# Patient Record
Sex: FEMALE | Race: WHITE | HISPANIC OR LATINO | URBAN - METROPOLITAN AREA
[De-identification: names, ages, dates, MRNs, and addresses within clinical notes are randomized per-mention and may not be internally consistent; named-entity substitution may affect disease eponyms.]

---

## 2018-08-24 ENCOUNTER — INPATIENT (INPATIENT)
Facility: HOSPITAL | Age: 28
LOS: 0 days | Discharge: HOME | DRG: 743 | End: 2018-08-24
Attending: OBSTETRICS & GYNECOLOGY | Admitting: OBSTETRICS & GYNECOLOGY
Payer: COMMERCIAL

## 2018-08-24 VITALS
DIASTOLIC BLOOD PRESSURE: 86 MMHG | TEMPERATURE: 98 F | WEIGHT: 158.07 LBS | RESPIRATION RATE: 16 BRPM | OXYGEN SATURATION: 100 % | HEART RATE: 73 BPM | SYSTOLIC BLOOD PRESSURE: 118 MMHG | HEIGHT: 65 IN

## 2018-08-24 VITALS
DIASTOLIC BLOOD PRESSURE: 70 MMHG | TEMPERATURE: 98 F | OXYGEN SATURATION: 100 % | SYSTOLIC BLOOD PRESSURE: 113 MMHG | RESPIRATION RATE: 15 BRPM | HEART RATE: 74 BPM

## 2018-08-24 DIAGNOSIS — N83.202 UNSPECIFIED OVARIAN CYST, LEFT SIDE: ICD-10-CM

## 2018-08-24 DIAGNOSIS — N83.519 TORSION OF OVARY AND OVARIAN PEDICLE, UNSPECIFIED SIDE: ICD-10-CM

## 2018-08-24 LAB
ABO RH CONFIRMATION: SIGNIFICANT CHANGE UP
ALBUMIN SERPL ELPH-MCNC: 3.4 G/DL — LOW (ref 3.5–5)
ALBUMIN SERPL ELPH-MCNC: 3.7 G/DL — SIGNIFICANT CHANGE UP (ref 3.5–5)
ALP SERPL-CCNC: 63 U/L — SIGNIFICANT CHANGE UP (ref 40–120)
ALP SERPL-CCNC: 67 U/L — SIGNIFICANT CHANGE UP (ref 40–120)
ALT FLD-CCNC: 17 U/L DA — SIGNIFICANT CHANGE UP (ref 10–60)
ALT FLD-CCNC: 20 U/L DA — SIGNIFICANT CHANGE UP (ref 10–60)
ANION GAP SERPL CALC-SCNC: 10 MMOL/L — SIGNIFICANT CHANGE UP (ref 5–17)
ANION GAP SERPL CALC-SCNC: 6 MMOL/L — SIGNIFICANT CHANGE UP (ref 5–17)
APPEARANCE UR: CLEAR — SIGNIFICANT CHANGE UP
APTT BLD: 32.3 SEC — SIGNIFICANT CHANGE UP (ref 27.5–37.4)
AST SERPL-CCNC: 17 U/L — SIGNIFICANT CHANGE UP (ref 10–40)
AST SERPL-CCNC: 42 U/L — HIGH (ref 10–40)
BASOPHILS # BLD AUTO: 0 K/UL — SIGNIFICANT CHANGE UP (ref 0–0.2)
BASOPHILS # BLD AUTO: 0.1 K/UL — SIGNIFICANT CHANGE UP (ref 0–0.2)
BASOPHILS NFR BLD AUTO: 0.2 % — SIGNIFICANT CHANGE UP (ref 0–2)
BASOPHILS NFR BLD AUTO: 1 % — SIGNIFICANT CHANGE UP (ref 0–2)
BILIRUB SERPL-MCNC: 0.7 MG/DL — SIGNIFICANT CHANGE UP (ref 0.2–1.2)
BILIRUB SERPL-MCNC: 0.7 MG/DL — SIGNIFICANT CHANGE UP (ref 0.2–1.2)
BILIRUB UR-MCNC: NEGATIVE — SIGNIFICANT CHANGE UP
BUN SERPL-MCNC: 11 MG/DL — SIGNIFICANT CHANGE UP (ref 7–18)
BUN SERPL-MCNC: 8 MG/DL — SIGNIFICANT CHANGE UP (ref 7–18)
C TRACH RRNA SPEC QL NAA+PROBE: SIGNIFICANT CHANGE UP
CALCIUM SERPL-MCNC: 8.9 MG/DL — SIGNIFICANT CHANGE UP (ref 8.4–10.5)
CALCIUM SERPL-MCNC: 9 MG/DL — SIGNIFICANT CHANGE UP (ref 8.4–10.5)
CHLORIDE SERPL-SCNC: 103 MMOL/L — SIGNIFICANT CHANGE UP (ref 96–108)
CHLORIDE SERPL-SCNC: 106 MMOL/L — SIGNIFICANT CHANGE UP (ref 96–108)
CK MB BLD-MCNC: 1.2 % — SIGNIFICANT CHANGE UP (ref 0–3.5)
CK MB CFR SERPL CALC: 1.1 NG/ML — SIGNIFICANT CHANGE UP (ref 0–3.6)
CK SERPL-CCNC: 95 U/L — SIGNIFICANT CHANGE UP (ref 21–215)
CO2 SERPL-SCNC: 25 MMOL/L — SIGNIFICANT CHANGE UP (ref 22–31)
CO2 SERPL-SCNC: 27 MMOL/L — SIGNIFICANT CHANGE UP (ref 22–31)
COLOR SPEC: YELLOW — SIGNIFICANT CHANGE UP
CREAT SERPL-MCNC: 0.69 MG/DL — SIGNIFICANT CHANGE UP (ref 0.5–1.3)
CREAT SERPL-MCNC: 0.72 MG/DL — SIGNIFICANT CHANGE UP (ref 0.5–1.3)
DIFF PNL FLD: NEGATIVE — SIGNIFICANT CHANGE UP
EOSINOPHIL # BLD AUTO: 0 K/UL — SIGNIFICANT CHANGE UP (ref 0–0.5)
EOSINOPHIL # BLD AUTO: 0 K/UL — SIGNIFICANT CHANGE UP (ref 0–0.5)
EOSINOPHIL NFR BLD AUTO: 0 % — SIGNIFICANT CHANGE UP (ref 0–6)
EOSINOPHIL NFR BLD AUTO: 0.8 % — SIGNIFICANT CHANGE UP (ref 0–6)
GLUCOSE SERPL-MCNC: 114 MG/DL — HIGH (ref 70–99)
GLUCOSE SERPL-MCNC: 89 MG/DL — SIGNIFICANT CHANGE UP (ref 70–99)
GLUCOSE UR QL: NEGATIVE — SIGNIFICANT CHANGE UP
HCG UR QL: NEGATIVE — SIGNIFICANT CHANGE UP
HCT VFR BLD CALC: 39 % — SIGNIFICANT CHANGE UP (ref 34.5–45)
HCT VFR BLD CALC: 41.5 % — SIGNIFICANT CHANGE UP (ref 34.5–45)
HGB BLD-MCNC: 12.7 G/DL — SIGNIFICANT CHANGE UP (ref 11.5–15.5)
HGB BLD-MCNC: 13.9 G/DL — SIGNIFICANT CHANGE UP (ref 11.5–15.5)
INR BLD: 1.05 RATIO — SIGNIFICANT CHANGE UP (ref 0.88–1.16)
INR BLD: 1.07 RATIO — SIGNIFICANT CHANGE UP (ref 0.88–1.16)
KETONES UR-MCNC: ABNORMAL
LACTATE SERPL-SCNC: 0.7 MMOL/L — SIGNIFICANT CHANGE UP (ref 0.7–2)
LEUKOCYTE ESTERASE UR-ACNC: NEGATIVE — SIGNIFICANT CHANGE UP
LYMPHOCYTES # BLD AUTO: 1.1 K/UL — SIGNIFICANT CHANGE UP (ref 1–3.3)
LYMPHOCYTES # BLD AUTO: 2.5 K/UL — SIGNIFICANT CHANGE UP (ref 1–3.3)
LYMPHOCYTES # BLD AUTO: 41.6 % — SIGNIFICANT CHANGE UP (ref 13–44)
LYMPHOCYTES # BLD AUTO: 8.8 % — LOW (ref 13–44)
MAGNESIUM SERPL-MCNC: 1.6 MG/DL — SIGNIFICANT CHANGE UP (ref 1.6–2.6)
MCHC RBC-ENTMCNC: 30.1 PG — SIGNIFICANT CHANGE UP (ref 27–34)
MCHC RBC-ENTMCNC: 31.3 PG — SIGNIFICANT CHANGE UP (ref 27–34)
MCHC RBC-ENTMCNC: 32.5 GM/DL — SIGNIFICANT CHANGE UP (ref 32–36)
MCHC RBC-ENTMCNC: 33.5 GM/DL — SIGNIFICANT CHANGE UP (ref 32–36)
MCV RBC AUTO: 92.6 FL — SIGNIFICANT CHANGE UP (ref 80–100)
MCV RBC AUTO: 93.4 FL — SIGNIFICANT CHANGE UP (ref 80–100)
MONOCYTES # BLD AUTO: 0.2 K/UL — SIGNIFICANT CHANGE UP (ref 0–0.9)
MONOCYTES # BLD AUTO: 0.6 K/UL — SIGNIFICANT CHANGE UP (ref 0–0.9)
MONOCYTES NFR BLD AUTO: 1.6 % — LOW (ref 2–14)
MONOCYTES NFR BLD AUTO: 9 % — SIGNIFICANT CHANGE UP (ref 2–14)
N GONORRHOEA RRNA SPEC QL NAA+PROBE: SIGNIFICANT CHANGE UP
NEUTROPHILS # BLD AUTO: 11.6 K/UL — HIGH (ref 1.8–7.4)
NEUTROPHILS # BLD AUTO: 2.9 K/UL — SIGNIFICANT CHANGE UP (ref 1.8–7.4)
NEUTROPHILS NFR BLD AUTO: 47.6 % — SIGNIFICANT CHANGE UP (ref 43–77)
NEUTROPHILS NFR BLD AUTO: 89.3 % — HIGH (ref 43–77)
NITRITE UR-MCNC: NEGATIVE — SIGNIFICANT CHANGE UP
PH UR: 8 — SIGNIFICANT CHANGE UP (ref 5–8)
PHOSPHATE SERPL-MCNC: 2.7 MG/DL — SIGNIFICANT CHANGE UP (ref 2.5–4.5)
PLATELET # BLD AUTO: 247 K/UL — SIGNIFICANT CHANGE UP (ref 150–400)
PLATELET # BLD AUTO: 268 K/UL — SIGNIFICANT CHANGE UP (ref 150–400)
POTASSIUM SERPL-MCNC: 3.5 MMOL/L — SIGNIFICANT CHANGE UP (ref 3.5–5.3)
POTASSIUM SERPL-MCNC: 5.3 MMOL/L — SIGNIFICANT CHANGE UP (ref 3.5–5.3)
POTASSIUM SERPL-SCNC: 3.5 MMOL/L — SIGNIFICANT CHANGE UP (ref 3.5–5.3)
POTASSIUM SERPL-SCNC: 5.3 MMOL/L — SIGNIFICANT CHANGE UP (ref 3.5–5.3)
PROT SERPL-MCNC: 7.9 G/DL — SIGNIFICANT CHANGE UP (ref 6–8.3)
PROT SERPL-MCNC: 7.9 G/DL — SIGNIFICANT CHANGE UP (ref 6–8.3)
PROT UR-MCNC: NEGATIVE — SIGNIFICANT CHANGE UP
PROTHROM AB SERPL-ACNC: 11.5 SEC — SIGNIFICANT CHANGE UP (ref 9.8–12.7)
PROTHROM AB SERPL-ACNC: 11.7 SEC — SIGNIFICANT CHANGE UP (ref 9.8–12.7)
RBC # BLD: 4.21 M/UL — SIGNIFICANT CHANGE UP (ref 3.8–5.2)
RBC # BLD: 4.44 M/UL — SIGNIFICANT CHANGE UP (ref 3.8–5.2)
RBC # FLD: 11.8 % — SIGNIFICANT CHANGE UP (ref 10.3–14.5)
RBC # FLD: 12.3 % — SIGNIFICANT CHANGE UP (ref 10.3–14.5)
SODIUM SERPL-SCNC: 138 MMOL/L — SIGNIFICANT CHANGE UP (ref 135–145)
SODIUM SERPL-SCNC: 139 MMOL/L — SIGNIFICANT CHANGE UP (ref 135–145)
SP GR SPEC: 1.01 — SIGNIFICANT CHANGE UP (ref 1.01–1.02)
SPECIMEN SOURCE: SIGNIFICANT CHANGE UP
TROPONIN I SERPL-MCNC: <0.015 NG/ML — SIGNIFICANT CHANGE UP (ref 0–0.04)
UROBILINOGEN FLD QL: NEGATIVE — SIGNIFICANT CHANGE UP
WBC # BLD: 13 K/UL — HIGH (ref 3.8–10.5)
WBC # BLD: 6.1 K/UL — SIGNIFICANT CHANGE UP (ref 3.8–10.5)
WBC # FLD AUTO: 13 K/UL — HIGH (ref 3.8–10.5)
WBC # FLD AUTO: 6.1 K/UL — SIGNIFICANT CHANGE UP (ref 3.8–10.5)

## 2018-08-24 PROCEDURE — 76830 TRANSVAGINAL US NON-OB: CPT | Mod: 26

## 2018-08-24 PROCEDURE — 93010 ELECTROCARDIOGRAM REPORT: CPT

## 2018-08-24 PROCEDURE — 99285 EMERGENCY DEPT VISIT HI MDM: CPT

## 2018-08-24 PROCEDURE — 74177 CT ABD & PELVIS W/CONTRAST: CPT | Mod: 26

## 2018-08-24 PROCEDURE — 76856 US EXAM PELVIC COMPLETE: CPT | Mod: 26

## 2018-08-24 RX ORDER — SODIUM CHLORIDE 9 MG/ML
1000 INJECTION, SOLUTION INTRAVENOUS
Qty: 0 | Refills: 0 | Status: DISCONTINUED | OUTPATIENT
Start: 2018-08-24 | End: 2018-08-27

## 2018-08-24 RX ORDER — DOCUSATE SODIUM 100 MG
1 CAPSULE ORAL
Qty: 20 | Refills: 0 | OUTPATIENT
Start: 2018-08-24 | End: 2018-09-02

## 2018-08-24 RX ORDER — SIMETHICONE 80 MG/1
80 TABLET, CHEWABLE ORAL EVERY 4 HOURS
Qty: 0 | Refills: 0 | Status: DISCONTINUED | OUTPATIENT
Start: 2018-08-24 | End: 2018-08-27

## 2018-08-24 RX ORDER — SENNA PLUS 8.6 MG/1
2 TABLET ORAL
Qty: 10 | Refills: 0 | OUTPATIENT
Start: 2018-08-24 | End: 2018-08-28

## 2018-08-24 RX ORDER — IBUPROFEN 200 MG
600 TABLET ORAL EVERY 6 HOURS
Qty: 0 | Refills: 0 | Status: DISCONTINUED | OUTPATIENT
Start: 2018-08-24 | End: 2018-08-27

## 2018-08-24 RX ORDER — KETOROLAC TROMETHAMINE 30 MG/ML
15 SYRINGE (ML) INJECTION ONCE
Qty: 0 | Refills: 0 | Status: COMPLETED | OUTPATIENT
Start: 2018-08-24 | End: 2018-08-24

## 2018-08-24 RX ORDER — SODIUM CHLORIDE 9 MG/ML
1000 INJECTION INTRAMUSCULAR; INTRAVENOUS; SUBCUTANEOUS ONCE
Qty: 0 | Refills: 0 | Status: COMPLETED | OUTPATIENT
Start: 2018-08-24 | End: 2018-08-24

## 2018-08-24 RX ORDER — SIMETHICONE 80 MG/1
1 TABLET, CHEWABLE ORAL
Qty: 30 | Refills: 0 | OUTPATIENT
Start: 2018-08-24 | End: 2018-08-28

## 2018-08-24 RX ORDER — IBUPROFEN 200 MG
1 TABLET ORAL
Qty: 40 | Refills: 1 | OUTPATIENT
Start: 2018-08-24 | End: 2018-09-12

## 2018-08-24 RX ORDER — HYDROMORPHONE HYDROCHLORIDE 2 MG/ML
0.5 INJECTION INTRAMUSCULAR; INTRAVENOUS; SUBCUTANEOUS
Qty: 0 | Refills: 0 | Status: DISCONTINUED | OUTPATIENT
Start: 2018-08-24 | End: 2018-08-27

## 2018-08-24 RX ORDER — SODIUM CHLORIDE 9 MG/ML
1000 INJECTION, SOLUTION INTRAVENOUS
Qty: 0 | Refills: 0 | Status: DISCONTINUED | OUTPATIENT
Start: 2018-08-24 | End: 2018-08-24

## 2018-08-24 RX ORDER — ONDANSETRON 8 MG/1
4 TABLET, FILM COATED ORAL ONCE
Qty: 0 | Refills: 0 | Status: DISCONTINUED | OUTPATIENT
Start: 2018-08-24 | End: 2018-08-27

## 2018-08-24 RX ORDER — KETOROLAC TROMETHAMINE 30 MG/ML
15 SYRINGE (ML) INJECTION ONCE
Qty: 0 | Refills: 0 | Status: DISCONTINUED | OUTPATIENT
Start: 2018-08-24 | End: 2018-08-24

## 2018-08-24 RX ORDER — ACETAMINOPHEN 500 MG
650 TABLET ORAL EVERY 6 HOURS
Qty: 0 | Refills: 0 | Status: DISCONTINUED | OUTPATIENT
Start: 2018-08-24 | End: 2018-08-27

## 2018-08-24 RX ADMIN — Medication 15 MILLIGRAM(S): at 11:11

## 2018-08-24 RX ADMIN — Medication 15 MILLIGRAM(S): at 13:15

## 2018-08-24 RX ADMIN — HYDROMORPHONE HYDROCHLORIDE 0.5 MILLIGRAM(S): 2 INJECTION INTRAMUSCULAR; INTRAVENOUS; SUBCUTANEOUS at 16:15

## 2018-08-24 RX ADMIN — HYDROMORPHONE HYDROCHLORIDE 0.5 MILLIGRAM(S): 2 INJECTION INTRAMUSCULAR; INTRAVENOUS; SUBCUTANEOUS at 16:45

## 2018-08-24 RX ADMIN — HYDROMORPHONE HYDROCHLORIDE 0.5 MILLIGRAM(S): 2 INJECTION INTRAMUSCULAR; INTRAVENOUS; SUBCUTANEOUS at 17:01

## 2018-08-24 RX ADMIN — HYDROMORPHONE HYDROCHLORIDE 0.5 MILLIGRAM(S): 2 INJECTION INTRAMUSCULAR; INTRAVENOUS; SUBCUTANEOUS at 16:05

## 2018-08-24 RX ADMIN — SODIUM CHLORIDE 1000 MILLILITER(S): 9 INJECTION INTRAMUSCULAR; INTRAVENOUS; SUBCUTANEOUS at 11:11

## 2018-08-24 NOTE — H&P ADULT - NSHPPHYSICALEXAM_GEN_ALL_CORE
pt seen with dr velasquez  pt was given iv toradol in ER at 11:11am  pt is tender on left side no guarding at this time

## 2018-08-24 NOTE — DISCHARGE NOTE ADULT - INSTRUCTIONS
Remove band-aids tomorrow. You may shower in 24 hours, leave steri-strips in place & they will fall off when the incision is healed.

## 2018-08-24 NOTE — H&P ADULT - NSHPLABSRESULTS_GEN_ALL_CORE
13.9   6.1   )-----------( 247      ( 24 Aug 2018 09:36 )             41.5   08-24    139  |  106  |  11  ----------------------------<  89  5.3   |  27  |  0.69    Ca    9.0      24 Aug 2018 09:36    TPro  7.9  /  Alb  3.4<L>  /  TBili  0.7  /  DBili  x   /  AST  42<H>  /  ALT  20  /  AlkPhos  63  08-24  urine pregnancy: negative    t&s  packed cells [id number] ordered  pt/INR/PTT ordered  gc/chlam sent by ER

## 2018-08-24 NOTE — CHART NOTE - NSCHARTNOTEFT_GEN_A_CORE
RRT called at 7:20pm for patient reporting chest pain, midsternal x 5 min. Denies fever, chills, SOB, palpitations, nausea, vomiting, diarrhea or constipation. Reports inspiratory chest pain radiating to back. Patient was seen and examined, no acute distress. VSS stable, afebrile, /80, HR 90, 100% on room air, RR 12. EKG normal sinus rhythm without ischemic changes. CXR not remarkable. Patient is s/p diagnostic laparoscopy and left ovarian cystectomy with general anesthesia and was going to be discharged today. Will give 15mg IV toradol for pain. Ordered CBC, CMP, Mg, Phosph, INR, type and screen and cardiac enzymes. To be transferred to PACU for further monitoring.    INTERVAL HPI/OVERNIGHT EVENTS:  T(C): 36.4 (18 @ 17:30), Max: 37 (18 @ 10:26)  HR: 80 (18 @ 17:40) (60 - 95)  BP: 119/72 (18 @ 17:40) (107/72 - 127/90)  RR: 14 (18 @ 17:40) (13 - 18)  SpO2: 100% (18 @ 17:40) (98% - 100%)    24 Aug 2018 07:01  -  24 Aug 2018 19:49  --------------------------------------------------------  IN: 1650 mL / OUT: 200 mL / NET: 1450 mL    MEDICATIONS  (STANDING):  ketorolac   Injectable 15 milliGRAM(s) IV Push once  lactated ringers. 1000 milliLiter(s) (125 mL/Hr) IV Continuous <Continuous>  simethicone 80 milliGRAM(s) Chew every 4 hours    MEDICATIONS  (PRN):  acetaminophen   Tablet. 650 milliGRAM(s) Oral every 6 hours PRN Mild Pain (1 - 3)  HYDROmorphone  Injectable 0.5 milliGRAM(s) IV Push every 10 minutes PRN Moderate Pain (4 - 6)  ibuprofen  Tablet 600 milliGRAM(s) Oral every 6 hours PRN moderate pain 4-6  ondansetron Injectable 4 milliGRAM(s) IV Push once PRN Nausea and/or Vomiting    PHYSICAL EXAM:  GENERAL: NAD, well-groomed, well-developed  HEAD:  Atraumatic, Normocephalic  EYES: EOMI, PERRLA, conjunctiva and sclera clear  ENMT: No tonsillar erythema, exudates, or enlargement; Moist mucous membranes, Good dentition, No lesions  NECK: Supple, No JVD, Normal thyroid  NERVOUS SYSTEM:  Alert & Oriented X3, Good concentration; Motor Strength 5/5 B/L upper and lower extremities; DTRs 2+ intact and symmetric  CHEST/LUNG: Clear to percussion bilaterally; No rales, rhonchi, wheezing, or rubs  HEART: Regular rate and rhythm; No murmurs, rubs, or gallops  ABDOMEN: Soft, Nontender, Nondistended; Bowel sounds present  EXTREMITIES:  2+ Peripheral Pulses, No clubbing, cyanosis, or edema  LYMPH: No lymphadenopathy noted  SKIN: No rashes or lesions    LABS:                        13.9   6.1   )-----------( 247      ( 24 Aug 2018 09:36 )             41.5         139  |  106  |  11  ----------------------------<  89  5.3   |  27  |  0.69    Ca    9.0      24 Aug 2018 09:36    TPro  7.9  /  Alb  3.4<L>  /  TBili  0.7  /  DBili  x   /  AST  42<H>  /  ALT  20  /  AlkPhos  63      PT/INR - ( 24 Aug 2018 12:53 )   PT: 11.5 sec;   INR: 1.05 ratio         PTT - ( 24 Aug 2018 12:53 )  PTT:32.3 sec  Urinalysis Basic - ( 24 Aug 2018 12:54 )    Color: Yellow / Appearance: Clear / S.010 / pH: x  Gluc: x / Ketone: Trace  / Bili: Negative / Urobili: Negative   Blood: x / Protein: Negative / Nitrite: Negative   Leuk Esterase: Negative / RBC: x / WBC x   Sq Epi: x / Non Sq Epi: x / Bacteria: x    < from: CT Abdomen and Pelvis w/ IV Cont (18 @ 12:12) >    IMPRESSION:  Uncertain origin of a well-circumscribed cystic mass in the pelvis.   Differential considerations include duplication cyst, mesenteric cyst and   less likely an adnexal mass. Surgical consultation should be considered.    < end of copied text >    A/P    1) chest pain  EKG NSR  likely non cardiac  CXR clear  f/u labs  toradol IV x 1 for pain  likely 2/2 acid reflux or GI origin  vital signs stable  for post op diagnostic lap and left ovarian cystectomy, plan as per ob/gyn team

## 2018-08-24 NOTE — ED ADULT NURSE NOTE - NSIMPLEMENTINTERV_GEN_ALL_ED
Implemented All Universal Safety Interventions:  [place] to call system. Call bell, personal items and telephone within reach. Instruct patient to call for assistance. Room bathroom lighting operational. Non-slip footwear when patient is off stretcher. Physically safe environment: no spills, clutter or unnecessary equipment. Stretcher in lowest position, wheels locked, appropriate side rails in place.

## 2018-08-24 NOTE — DISCHARGE NOTE ADULT - CARE PROVIDER_API CALL
Sandeep Mitchell  [address] [facility] [address]  Phone: [phone]  Fax: (   )    -

## 2018-08-24 NOTE — DISCHARGE NOTE ADULT - PATIENT PORTAL LINK FT
You can access the [facility] Patient Portal, offered by [facility], by registering with the following website: [email or web address]

## 2018-08-24 NOTE — DISCHARGE NOTE ADULT - MEDICATION SUMMARY - MEDICATIONS TO TAKE
I will START or STAY ON the medications listed below when I get home from the hospital:    ibuprofen 600 mg oral tablet  -- 1 tab(s) by mouth every 6 hours, As needed, moderate pain 4-6  -- Indication: For Pls take for pain management    Colace 100 mg oral capsule  -- 1 cap(s) by mouth 2 times a day   -- Medication should be taken with plenty of water.    -- Indication: For stool softner    senna oral tablet  -- 2 tab(s) by mouth once a day (at bedtime)   -- Indication: For for bowel movement take at bedtime    simethicone 80 mg oral tablet, chewable  -- 1 tab(s) by mouth every 4 hours, As Needed   -- Indication: For Pls take to help pass gas

## 2018-08-24 NOTE — DISCHARGE NOTE ADULT - PLAN OF CARE
left para ovarian cyst noted by laparoscopy Removal of the cyst  see dr bender in the office in 1week  no sex nothing in vagina no heavy lifting no strenuous activities  no alcohol [id number] after surgery

## 2018-08-24 NOTE — DISCHARGE NOTE ADULT - PROVIDER TOKENS
FREE:[LAST:[Dr. Shirley],FIRST:[Sandeep],PHONE:[phone]],FAX:[phone]   [phone]    -],ADDRESS:[[address] [address]]]

## 2018-08-24 NOTE — ED ADULT NURSE NOTE - OBJECTIVE STATEMENT
pt c/o left lower abdominal pain x2 days radiating to lower abdominal area and vaginal discharge present

## 2018-08-24 NOTE — CHART NOTE - NSCHARTNOTEFT_GEN_A_CORE
Called to rapid response due to patient complaining of shortness of breath and chest pain.  States that she feels pain in the base of her ribs and in her shoulders when she tries to take a deep breath. Patient is s/p laparoscopic cystectomy and left salpingectomy today.  Patient appears in no acute distress and is breathing normally.      VS:  Vital Signs Last 24 Hrs  T(C): 36.4 (24 Aug 2018 17:30), Max: 37 (24 Aug 2018 10:26)  T(F): 97.6 (24 Aug 2018 17:30), Max: 98.6 (24 Aug 2018 10:26)  HR: 80 (24 Aug 2018 17:40) (60 - 95)  BP: 119/72 (24 Aug 2018 17:40) (107/72 - 127/90)  BP(mean): 80 (24 Aug 2018 17:40) (76 - 102)  RR: 14 (24 Aug 2018 17:40) (13 - 18)  SpO2: 100% (24 Aug 2018 17:40) (98% - 100%)    Gen: A&O x 3 NAD  Chest: CTABL, Tenderness to palpation along rib edge and upper chest  Cardiac: S1+S2+ RRR  Abdomen: Soft, nontender, dressings clean and dry, +BS.    Gyn: No vaginal bleeding    Labs pending    A/P: 27 year old P0 s/p laparoscopic procedure for large left cyst.    -Patient reassured that likely residual air in abdomen giving her the pleuritic chest pain  -Pain management  -Follow up labs and xray    Discussed with Dr Dawkins

## 2018-08-24 NOTE — H&P ADULT - HISTORY OF PRESENT ILLNESS
26yo nulliparous not pregnant +copper IUD in place   noted to have 10cm cyst +LLQ pain and vomiting  pt came to ER c/o LLQ pain since last night and vomiting  pt was given toradol here in ER at 11:11am  Sono shows +10cm midline cyst noted base on pt's s/sx presentation most likely ov cyst being torsion   dr bender reviewed sonogram film    pt has copper iud   pobhx: p0    pmh: denies  psxh: denies  meds:denies  allergeis:Nka

## 2018-08-24 NOTE — DISCHARGE NOTE ADULT - CARE PLAN
Principal Discharge DX:	Paratubal cyst  Goal:	left para ovarian cyst noted by laparoscopy  Assessment and plan of treatment:	Removal of the cyst  see dr bender in the office in 1week  no sex nothing in vagina no heavy lifting no strenuous activities  no alcohol [id number] after surgery

## 2018-08-24 NOTE — DISCHARGE NOTE ADULT - NS AS ACTIVITY OBS
No Heavy lifting/straining/Do not make important decisions/Showering allowed/Walking-Outdoors allowed/Walking-Indoors allowed/Stairs allowed/Do not drive or operate machinery

## 2018-08-24 NOTE — H&P ADULT - ASSESSMENT
26yo nulliparous not pregnant +IUD copper in place  by sono 10cm midline pelvic cyst  +LLQ pain/vomiting   high suspicion for ov torsion   -npo since 8pm last night  -no pmh  -admit for OR  -pt signed informed consent: operative laparoscopy removal of cyst possible removal of affected ovary possible minilap and other indicated procedures  -pt verbalized understanding she is for surgical intervention due to s/sx high suspicion for ov torsion with 10cm cyst noted by sono no flow noted on the cyst as film reviewed by dr bender

## 2018-08-24 NOTE — H&P ADULT - PROBLEM SELECTOR PLAN 2
26yo nulliparous not pregnant  by sono 10cm midline pelvic cyst  +LLQ pain/vomiting   high suspicion for ov torsion   -npo since 8pm last night  -no pmh  -admit for OR  -pt signed informed consent: operative laparoscopy removal of cyst possible removal of affected ovary possible minilap and other indicated procedures  -pt verbalized understanding she is for surgical intervention due to s/sx high suspicion for ov torsion with 10cm cyst noted by sono no flow noted on the cyst as film reviewed by dr bender

## 2018-08-24 NOTE — ED PROVIDER NOTE - OBJECTIVE STATEMENT
27 F with no PMH complaining of L sided pelvic pain, also slight malodorus vaginal discharge .  Patient has IUD, sexually active with 1 partner, no STI hx.  Patient states pain localized to LLQ and occasional radiation to back, worsening over past 2 days.  No other complaints.

## 2018-08-24 NOTE — DISCHARGE NOTE ADULT - ADDITIONAL INSTRUCTIONS
see dr bender in the office in 1week  no sex nothing in vagina no heavy lifting no strenuous activities  no alcohol [id number] after surgery

## 2018-08-24 NOTE — DISCHARGE NOTE ADULT - HOSPITAL COURSE
admitted from ER  by sono +10cm cyst noted   pt came c/o LLQ pain and vomiting  : operative laparoscopy: left para ovarian/paratubal cyst removed  pt dc home same day

## 2018-08-25 LAB
CULTURE RESULTS: SIGNIFICANT CHANGE UP
SPECIMEN SOURCE: SIGNIFICANT CHANGE UP

## 2018-08-26 PROCEDURE — 84100 ASSAY OF PHOSPHORUS: CPT

## 2018-08-26 PROCEDURE — 86923 COMPATIBILITY TEST ELECTRIC: CPT

## 2018-08-26 PROCEDURE — 96374 THER/PROPH/DIAG INJ IV PUSH: CPT

## 2018-08-26 PROCEDURE — 83735 ASSAY OF MAGNESIUM: CPT

## 2018-08-26 PROCEDURE — 82553 CREATINE MB FRACTION: CPT

## 2018-08-26 PROCEDURE — 76830 TRANSVAGINAL US NON-OB: CPT

## 2018-08-26 PROCEDURE — 87086 URINE CULTURE/COLONY COUNT: CPT

## 2018-08-26 PROCEDURE — 93005 ELECTROCARDIOGRAM TRACING: CPT

## 2018-08-26 PROCEDURE — 80053 COMPREHEN METABOLIC PANEL: CPT

## 2018-08-26 PROCEDURE — 84484 ASSAY OF TROPONIN QUANT: CPT

## 2018-08-26 PROCEDURE — 83605 ASSAY OF LACTIC ACID: CPT

## 2018-08-26 PROCEDURE — 87591 N.GONORRHOEAE DNA AMP PROB: CPT

## 2018-08-26 PROCEDURE — 86901 BLOOD TYPING SEROLOGIC RH(D): CPT

## 2018-08-26 PROCEDURE — 86900 BLOOD TYPING SEROLOGIC ABO: CPT

## 2018-08-26 PROCEDURE — 82962 GLUCOSE BLOOD TEST: CPT

## 2018-08-26 PROCEDURE — 85730 THROMBOPLASTIN TIME PARTIAL: CPT

## 2018-08-26 PROCEDURE — 82550 ASSAY OF CK (CPK): CPT

## 2018-08-26 PROCEDURE — 71045 X-RAY EXAM CHEST 1 VIEW: CPT

## 2018-08-26 PROCEDURE — 81003 URINALYSIS AUTO W/O SCOPE: CPT

## 2018-08-26 PROCEDURE — 81025 URINE PREGNANCY TEST: CPT

## 2018-08-26 PROCEDURE — 86850 RBC ANTIBODY SCREEN: CPT

## 2018-08-26 PROCEDURE — 36415 COLL VENOUS BLD VENIPUNCTURE: CPT

## 2018-08-26 PROCEDURE — 71045 X-RAY EXAM CHEST 1 VIEW: CPT | Mod: 26

## 2018-08-26 PROCEDURE — 85610 PROTHROMBIN TIME: CPT

## 2018-08-26 PROCEDURE — 99285 EMERGENCY DEPT VISIT HI MDM: CPT | Mod: 25

## 2018-08-26 PROCEDURE — 76856 US EXAM PELVIC COMPLETE: CPT

## 2018-08-26 PROCEDURE — 74177 CT ABD & PELVIS W/CONTRAST: CPT

## 2018-08-26 PROCEDURE — 85027 COMPLETE CBC AUTOMATED: CPT

## 2018-08-26 PROCEDURE — 87491 CHLMYD TRACH DNA AMP PROBE: CPT

## 2018-12-15 ENCOUNTER — EMERGENCY (EMERGENCY)
Facility: HOSPITAL | Age: 28
LOS: 1 days | Discharge: HOME | End: 2018-12-15
Attending: EMERGENCY MEDICINE
Payer: COMMERCIAL

## 2018-12-15 VITALS
HEART RATE: 83 BPM | TEMPERATURE: 98 F | RESPIRATION RATE: 17 BRPM | OXYGEN SATURATION: 98 % | SYSTOLIC BLOOD PRESSURE: 110 MMHG | HEIGHT: 65 IN | DIASTOLIC BLOOD PRESSURE: 75 MMHG | WEIGHT: 169.09 LBS

## 2018-12-15 LAB — HCG UR QL: NEGATIVE — SIGNIFICANT CHANGE UP

## 2018-12-15 PROCEDURE — 81025 URINE PREGNANCY TEST: CPT

## 2018-12-15 PROCEDURE — 96372 THER/PROPH/DIAG INJ SC/IM: CPT

## 2018-12-15 PROCEDURE — 99283 EMERGENCY DEPT VISIT LOW MDM: CPT | Mod: 25

## 2018-12-15 PROCEDURE — 99284 EMERGENCY DEPT VISIT MOD MDM: CPT

## 2018-12-15 RX ORDER — IBUPROFEN 200 MG
1 TABLET ORAL
Qty: 20 | Refills: 0 | OUTPATIENT
Start: 2018-12-15

## 2018-12-15 RX ORDER — CYCLOBENZAPRINE HYDROCHLORIDE 10 MG/1
1 TABLET, FILM COATED ORAL
Qty: 9 | Refills: 0 | OUTPATIENT
Start: 2018-12-15 | End: 2018-12-17

## 2018-12-15 RX ORDER — DIAZEPAM 5 MG
5 TABLET ORAL ONCE
Qty: 0 | Refills: 0 | Status: DISCONTINUED | OUTPATIENT
Start: 2018-12-15 | End: 2018-12-15

## 2018-12-15 RX ORDER — KETOROLAC TROMETHAMINE 30 MG/ML
60 SYRINGE (ML) INJECTION ONCE
Qty: 0 | Refills: 0 | Status: DISCONTINUED | OUTPATIENT
Start: 2018-12-15 | End: 2018-12-15

## 2018-12-15 RX ADMIN — Medication 5 MILLIGRAM(S): at 10:50

## 2018-12-15 RX ADMIN — Medication 60 MILLIGRAM(S): at 10:50

## 2018-12-15 NOTE — ED PROVIDER NOTE - OBJECTIVE STATEMENT
27 y/o F pt with no significant PMHx presents to ED c/o back pain x 1 day in the lower L area, paraspinal that started suddenly when she sneezed forcefully. Pt has been having some sneezing and dry cough over the past few days. Pt denies any other trauma, change in gait, numbness, tingling, weakness, saddle anesthesia, bowel incontinence, SOB, asthma hx, drug use or urinary symptoms

## 2018-12-15 NOTE — ED ADULT NURSE NOTE - CHPI ED NUR SYMPTOMS NEG
no bowel dysfunction/no numbness/no difficulty bearing weight/no motor function loss/no neck tenderness/no tingling/no anorexia/no constipation/no fatigue/no bladder dysfunction

## 2018-12-15 NOTE — ED PROVIDER NOTE - MEDICAL DECISION MAKING DETAILS
29 y/o F with back pain with no neuro deficits, normal exam, likely muscle spasm, vs sciatica vs radiculopathy. Pt given Toradol and valium and feels greatly improved. Ready for d/c with hot packs, with PCP and PT f/u.

## 2018-12-15 NOTE — ED ADULT NURSE NOTE - NSIMPLEMENTINTERV_GEN_ALL_ED
Implemented All Universal Safety Interventions:  [place] to call system. Call bell, personal items and telephone within reach. Instruct patient to call for assistance. Room bathroom lighting operational. Non-slip footwear when patient is off stretcher. Physically safe environment: no spills, clutter or unnecessary equipment. Stretcher in lowest position, wheels locked, appropriate side rails in place.

## 2018-12-15 NOTE — ED ADULT NURSE NOTE - OBJECTIVE STATEMENT
Patient presents to ED with c/o lower back pain after coughing all night. Patient states the pain is a 10/10 achy and sharp at time making it difficult to walk, She took ibuprofen and it helped just alittle

## 2018-12-15 NOTE — ED PROVIDER NOTE - CARE PLAN
Principal Discharge DX:	Acute left-sided low back pain without sciatica  Secondary Diagnosis:	Upper respiratory infection

## 2018-12-15 NOTE — ED PROVIDER NOTE - PHYSICAL EXAMINATION
well appearing but uncomfortable  clear lungs  no midline spinal tenderness or CVA tenderness  normal gait  b/l strength sensation and reflexes normal

## 2020-10-19 ENCOUNTER — EMERGENCY (EMERGENCY)
Facility: HOSPITAL | Age: 30
LOS: 1 days | Discharge: HOME | End: 2020-10-19
Attending: EMERGENCY MEDICINE
Payer: COMMERCIAL

## 2020-10-19 VITALS
DIASTOLIC BLOOD PRESSURE: 69 MMHG | SYSTOLIC BLOOD PRESSURE: 116 MMHG | WEIGHT: 175.05 LBS | HEIGHT: 65 IN | HEART RATE: 78 BPM | TEMPERATURE: 97 F | RESPIRATION RATE: 18 BRPM | OXYGEN SATURATION: 99 %

## 2020-10-19 PROCEDURE — 99283 EMERGENCY DEPT VISIT LOW MDM: CPT

## 2020-10-19 RX ADMIN — Medication 500 MILLIGRAM(S): at 15:37

## 2020-10-19 NOTE — ED PROVIDER NOTE - PATIENT PORTAL LINK FT
You can access the FollowMyHealth Patient Portal offered by [facility] by registering at the following website: [email or web address]. By joining [employer]’s FollowMyHealth portal, you will also be able to view your health information using other applications (apps) compatible with our system.

## 2020-10-19 NOTE — ED PROVIDER NOTE - OBJECTIVE STATEMENT
30y F presents with left thumb pain x 1 week that radiates to her forearm. Pt states that the injury occurred at work with continuos use of a machine that is hard to turn on by using her left dominant hand and doing an external turning motion. Pt denies any alleviation with the use of Motrin and states that limited use to her left thumb is now causing stiffness. Pt denies any numbness and tingling of left hand, denies any fevers ,chills, SOB, chest pain or recent sick contacts.

## 2020-10-19 NOTE — ED PROVIDER NOTE - CARE PROVIDER_API CALL
Lucho Cortés  SURGERY  [facility] [place] [address]  Phone: [phone]  Fax: [phone]  Follow Up Time:

## 2020-10-19 NOTE — ED PROVIDER NOTE - CLINICAL SUMMARY MEDICAL DECISION MAKING FREE TEXT BOX
30y F presents with + left thenar eminence tenderness and swelling. Will follow up with naproxen 500 mg PO and thumb spica splint.

## 2020-10-19 NOTE — ED ADULT NURSE NOTE - OBJECTIVE STATEMENT
Pt. c/o left thumb pain that radiates down the hand that is ongoing for a week. 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

## 2021-04-10 NOTE — ED ADULT NURSE NOTE - CHIEF COMPLAINT QUOTE
C/o pelvic pain x 2 days radiating to left lower back, left leg with vaginal discharge with odour
- - -

## 2021-07-16 NOTE — ED ADULT NURSE NOTE - NSFALLRSKASSESASSIST_ED_ALL_ED
Pt to MRI in stable condition, CTC aware pt will temporarily be off tele box while completing MRI   no

## 2022-12-20 NOTE — ED ADULT TRIAGE NOTE - PRO INTERPRETER NEED 2
Ashley Michael is a 28 year old woman  who presents today for annual.  She is sexually active.  Patient is currently using Kyleena for birth control.  She would like this out today-- has been in 5 years and also is considering attempting conception towards the end of .  She plans on using condoms until she is ready to conceive.  In regards to her menstrual cycles,  reports infrequent cycles before IUD placement, wondering how long she should go without a period before calling.    Feels mood has been really well controlled recently.    PAST GYNECOLOGIC HISTORY:   No LMP recorded. (Menstrual status: Intrauterine Device).     STIs: no prior history    PAST OBSTETRIC HISTORY:   OB History    Para Term  AB Living   0 0 0 0 0 0   SAB IAB Ectopic Molar Multiple Live Births   0 0 0 0 0 0       PAST MEDICAL HISTORY:   Past Medical History:   Diagnosis Date   • Allergy    • Anxiety    • Asthma    • Depressive disorder    • GERD (gastroesophageal reflux disease)    • Hemorrhoids 2021    per colonsocopy   • Scoliosis         PAST SURGICAL HISTORY:   Past Surgical History:   Procedure Laterality Date   • Colonoscopy w biopsy  2021    Random biopsies>normal path,hemorrhoids,Dr. Guillory   • Esophageal ph monitoring, 24-hour  2021    BRAVO>normal,Dr. Guillory   • Esophagogastroduodenoscopy transoral flex w/bx single or mult  2020    mild esophagitis,gastritis,SB BX>normal path,neg hpylori,.    • Esophagogastroduodenoscopy transoral flex w/bx single or mult  2021    mild,chronic gastritis,eso & SB bx>normal path,neg hpylori,Bravo pH probe>normal,Dr. Guillory        SOCIAL HISTORY:   Social History     Tobacco Use   • Smoking status: Never   • Smokeless tobacco: Never   Substance Use Topics   • Alcohol use: Yes     Alcohol/week: 1.0 standard drink     Types: 1 Glasses of wine per week     Comment: 4 times a year on the holidays       FAMILY  HISTORY:    Family History   Problem Relation Age of Onset   • Blood Disorder Mother    • Other Mother         Fibromyalgia   • Psychiatric Father         bipolar-    • Psychiatric Sister         bipolar   • Asthma Sister    • Depression Sister    • Psychiatric Paternal Grandfather         no DX-but family knew he was mentally ill   • Substance abuse Maternal Aunt         alcohol   • Cancer, Breast Maternal Aunt         multiple times   • Cancer Maternal Aunt         breast cancer X2   • Cancer, Breast Maternal Cousin         multiple times         REVIEW OF SYSTEMS:    CONSTITUTIONAL: Denies fever/sweats and unintentional weight change.  CARDIOVASCULAR: Denies chest discomfort with exertion, SOB (shortness of breath) or palpitations.   RESPIRATORY: Denies cough, SOB, or change in exercise tolerance.   GASTROINTESTINAL: Denies abdominal pain, nausea, change in bowel habits, or melena.   GENITOURINARY: Denies frequency, dysuria, abnormal vaginal discharge/odor, or abnormal vaginal bleeding.   A complete review of systems was completed and was negative except as noted above.    PHYSICAL EXAM:  Visit Vitals  /78   Pulse (!) 108   Ht 5' 10\" (1.778 m)   Wt 95.1 kg (209 lb 11.2 oz)   SpO2 97%   BMI 30.09 kg/m²       GENERAL: Alert and oriented. Mood appropriate.  CV: RRR, no murmurs  CHEST: CTAB, no crackles or wheezes  NECK: supple, no masses, no thyromegaly  BREASTS: Skin appears normal without dimpling or puckering. Breasts symmetric without palpable masses. Breast self-exam reviewed and encouraged.  ABDOMEN: Soft, nondistended and nontender.  PELVIC EXAM: External genitalia: No lesions or masses noted and normal external genitalia  VAGINA: pink rugated vaginal mucosa and physiologic appearing discharge  CERVIX: Normal and without  lesions or masses .  IUD strings seen in place  UTERUS: mobile , firm,non-tender, anteverted, small size   ADNEXA: No masses  and no tenderness.    ASSESSMENT/PLAN  Ashley PERALES  Alec is a 28 year old  who presents for routine annual exam.  Findings today are within normal limits.      Contraception  · Kyleena removed today  · Plans condoms  · Recommend prenatal vitamin    PCOS  · Reviewed risk for endometrial hyperplasia if not having regular menstrual cycles  · Recommend UPT followed by course of provera if no menses x 2-3months, rx provided  · Discussed small weight loss may help with this as well as with fertility  · Aware to schedule appt when ready to conceive if not having menstrual cycles    Depression  · PHQ-2 score was 1 today, patient feels mood is doing very well on current medication regimen  · Reviewed with patient would recommended discussing med regimen with prescriber prior to attempting conception.  Reviewed that psychiatric medications in pregnancy require weighing risks and benefits, untreated depression has risks in pregnancy however safety data regarding aripiprazole is limited in pregnancy, may be best to consider adjusting regimen prior to conception    Routine health maintenance  · Last pap 2020, NIL.  Next due .  Continue cervical cancer screening per ACOG (American Congress of Obstetricians and Gynecologists) Pap guidelines, reviewed with patient.   · Mammogram not yet indicated  · HPV immunization completed  · STD (sexually transmitted disease) screening: declines   · Counseled regarding condom use  · Return to clinic in 1 year or as needed.      English

## 2022-12-27 ENCOUNTER — EMERGENCY (EMERGENCY)
Facility: HOSPITAL | Age: 32
LOS: 1 days | Discharge: HOME | End: 2022-12-27
Attending: EMERGENCY MEDICINE
Payer: COMMERCIAL

## 2022-12-27 VITALS
TEMPERATURE: 99 F | RESPIRATION RATE: 18 BRPM | OXYGEN SATURATION: 99 % | SYSTOLIC BLOOD PRESSURE: 122 MMHG | DIASTOLIC BLOOD PRESSURE: 87 MMHG | HEART RATE: 92 BPM

## 2022-12-27 VITALS
TEMPERATURE: 98 F | HEART RATE: 106 BPM | OXYGEN SATURATION: 96 % | DIASTOLIC BLOOD PRESSURE: 81 MMHG | SYSTOLIC BLOOD PRESSURE: 119 MMHG | RESPIRATION RATE: 18 BRPM | WEIGHT: 182.98 LBS

## 2022-12-27 LAB
ALBUMIN SERPL ELPH-MCNC: 4 G/DL — SIGNIFICANT CHANGE UP (ref 3.5–5)
ALP SERPL-CCNC: 68 U/L — SIGNIFICANT CHANGE UP (ref 40–120)
ALT FLD-CCNC: 20 U/L DA — SIGNIFICANT CHANGE UP (ref 10–60)
ANION GAP SERPL CALC-SCNC: 6 MMOL/L — SIGNIFICANT CHANGE UP (ref 5–17)
AST SERPL-CCNC: 17 U/L — SIGNIFICANT CHANGE UP (ref 10–40)
BASOPHILS # BLD AUTO: 0.02 K/UL — SIGNIFICANT CHANGE UP (ref 0–0.2)
BASOPHILS NFR BLD AUTO: 0.2 % — SIGNIFICANT CHANGE UP (ref 0–2)
BILIRUB SERPL-MCNC: 0.7 MG/DL — SIGNIFICANT CHANGE UP (ref 0.2–1.2)
BUN SERPL-MCNC: 14 MG/DL — SIGNIFICANT CHANGE UP (ref 7–18)
CALCIUM SERPL-MCNC: 9.2 MG/DL — SIGNIFICANT CHANGE UP (ref 8.4–10.5)
CHLORIDE SERPL-SCNC: 107 MMOL/L — SIGNIFICANT CHANGE UP (ref 96–108)
CO2 SERPL-SCNC: 27 MMOL/L — SIGNIFICANT CHANGE UP (ref 22–31)
CREAT SERPL-MCNC: 0.68 MG/DL — SIGNIFICANT CHANGE UP (ref 0.5–1.3)
EGFR: 120 ML/MIN/1.73M2 — SIGNIFICANT CHANGE UP
EOSINOPHIL # BLD AUTO: 0.05 K/UL — SIGNIFICANT CHANGE UP (ref 0–0.5)
EOSINOPHIL NFR BLD AUTO: 0.5 % — SIGNIFICANT CHANGE UP (ref 0–6)
GLUCOSE SERPL-MCNC: 120 MG/DL — HIGH (ref 70–99)
HCG SERPL-ACNC: <1 MIU/ML — SIGNIFICANT CHANGE UP
HCT VFR BLD CALC: 41.4 % — SIGNIFICANT CHANGE UP (ref 34.5–45)
HGB BLD-MCNC: 13.9 G/DL — SIGNIFICANT CHANGE UP (ref 11.5–15.5)
IMM GRANULOCYTES NFR BLD AUTO: 0.3 % — SIGNIFICANT CHANGE UP (ref 0–0.9)
LACTATE SERPL-SCNC: 0.9 MMOL/L — SIGNIFICANT CHANGE UP (ref 0.7–2)
LIDOCAIN IGE QN: 130 U/L — SIGNIFICANT CHANGE UP (ref 73–393)
LYMPHOCYTES # BLD AUTO: 0.77 K/UL — LOW (ref 1–3.3)
LYMPHOCYTES # BLD AUTO: 7.9 % — LOW (ref 13–44)
MCHC RBC-ENTMCNC: 31.4 PG — SIGNIFICANT CHANGE UP (ref 27–34)
MCHC RBC-ENTMCNC: 33.6 GM/DL — SIGNIFICANT CHANGE UP (ref 32–36)
MCV RBC AUTO: 93.5 FL — SIGNIFICANT CHANGE UP (ref 80–100)
MONOCYTES # BLD AUTO: 0.65 K/UL — SIGNIFICANT CHANGE UP (ref 0–0.9)
MONOCYTES NFR BLD AUTO: 6.6 % — SIGNIFICANT CHANGE UP (ref 2–14)
NEUTROPHILS # BLD AUTO: 8.28 K/UL — HIGH (ref 1.8–7.4)
NEUTROPHILS NFR BLD AUTO: 84.5 % — HIGH (ref 43–77)
NRBC # BLD: 0 /100 WBCS — SIGNIFICANT CHANGE UP (ref 0–0)
PLATELET # BLD AUTO: 240 K/UL — SIGNIFICANT CHANGE UP (ref 150–400)
POTASSIUM SERPL-MCNC: 3.9 MMOL/L — SIGNIFICANT CHANGE UP (ref 3.5–5.3)
POTASSIUM SERPL-SCNC: 3.9 MMOL/L — SIGNIFICANT CHANGE UP (ref 3.5–5.3)
PROT SERPL-MCNC: 8.2 G/DL — SIGNIFICANT CHANGE UP (ref 6–8.3)
RBC # BLD: 4.43 M/UL — SIGNIFICANT CHANGE UP (ref 3.8–5.2)
RBC # FLD: 12.1 % — SIGNIFICANT CHANGE UP (ref 10.3–14.5)
SODIUM SERPL-SCNC: 140 MMOL/L — SIGNIFICANT CHANGE UP (ref 135–145)
WBC # BLD: 9.8 K/UL — SIGNIFICANT CHANGE UP (ref 3.8–10.5)
WBC # FLD AUTO: 9.8 K/UL — SIGNIFICANT CHANGE UP (ref 3.8–10.5)

## 2022-12-27 PROCEDURE — 99284 EMERGENCY DEPT VISIT MOD MDM: CPT | Mod: 25

## 2022-12-27 PROCEDURE — 84702 CHORIONIC GONADOTROPIN TEST: CPT

## 2022-12-27 PROCEDURE — 96375 TX/PRO/DX INJ NEW DRUG ADDON: CPT

## 2022-12-27 PROCEDURE — 83605 ASSAY OF LACTIC ACID: CPT

## 2022-12-27 PROCEDURE — 96374 THER/PROPH/DIAG INJ IV PUSH: CPT

## 2022-12-27 PROCEDURE — 99284 EMERGENCY DEPT VISIT MOD MDM: CPT

## 2022-12-27 PROCEDURE — 36415 COLL VENOUS BLD VENIPUNCTURE: CPT

## 2022-12-27 PROCEDURE — 80053 COMPREHEN METABOLIC PANEL: CPT

## 2022-12-27 PROCEDURE — 85025 COMPLETE CBC W/AUTO DIFF WBC: CPT

## 2022-12-27 PROCEDURE — 83690 ASSAY OF LIPASE: CPT

## 2022-12-27 RX ORDER — FAMOTIDINE 10 MG/ML
20 INJECTION INTRAVENOUS ONCE
Refills: 0 | Status: COMPLETED | OUTPATIENT
Start: 2022-12-27 | End: 2022-12-27

## 2022-12-27 RX ORDER — ONDANSETRON 8 MG/1
4 TABLET, FILM COATED ORAL ONCE
Refills: 0 | Status: COMPLETED | OUTPATIENT
Start: 2022-12-27 | End: 2022-12-27

## 2022-12-27 RX ORDER — SODIUM CHLORIDE 9 MG/ML
1000 INJECTION INTRAMUSCULAR; INTRAVENOUS; SUBCUTANEOUS ONCE
Refills: 0 | Status: COMPLETED | OUTPATIENT
Start: 2022-12-27 | End: 2022-12-27

## 2022-12-27 RX ADMIN — ONDANSETRON 4 MILLIGRAM(S): 8 TABLET, FILM COATED ORAL at 05:35

## 2022-12-27 RX ADMIN — FAMOTIDINE 20 MILLIGRAM(S): 10 INJECTION INTRAVENOUS at 05:35

## 2022-12-27 RX ADMIN — SODIUM CHLORIDE 1000 MILLILITER(S): 9 INJECTION INTRAMUSCULAR; INTRAVENOUS; SUBCUTANEOUS at 05:36

## 2022-12-27 RX ADMIN — Medication 30 MILLILITER(S): at 05:35

## 2022-12-27 NOTE — ED PROVIDER NOTE - CLINICAL SUMMARY MEDICAL DECISION MAKING FREE TEXT BOX
32 yr old female with no hx presents to ed c/o abd pain, n/v/d since 7p. no fever, no dysuria, no alcohol, no drugs.    gastroenteritis- meds, labs, po challenge

## 2022-12-27 NOTE — ED PROVIDER NOTE - PATIENT PORTAL LINK FT
You can access the FollowMyHealth Patient Portal offered by [facility] by registering at the following website: [email or web address]. By joining [employer]’s FollowMyHealth portal, you will also be able to view your health information using other applications (apps) compatible with our system.

## 2022-12-27 NOTE — ED PROVIDER NOTE - OBJECTIVE STATEMENT
32 yr old female with no hx presents to ed c/o abd pain, n/v/d since 7p. no fever, no dysuria, no alcohol, no drugs.

## 2022-12-27 NOTE — ED ADULT NURSE NOTE - NSIMPLEMENTINTERV_GEN_ALL_ED
Implemented All Universal Safety Interventions:  [place] to call system. Call bell, personal items and telephone within reach. Instruct patient to call for assistance. Room bathroom lighting operational. Non-slip footwear when patient is off stretcher. Physically safe environment: no spills, clutter or unnecessary equipment. Stretcher in lowest position, wheels locked, appropriate side rails in place.

## 2022-12-30 NOTE — ED ADULT NURSE NOTE - NSSISCREENINGQ1_ED_A_ED
Please take ibuprofen and a muscle relaxer as needed  Please also use a heating pad to your left side for 10 to 15 minutes multiple times throughout the day and do some light stretches  If the Flexeril makes you sleepy please take at night before bed  Please follow-up with your PCP as needed  
No

## 2025-03-11 NOTE — ED ADULT NURSE NOTE - CAS EDN DISCHARGE ASSESSMENT
S-(situation): Patient requesting refills for amiodarone and protonix     B-(background): patient was hospitalized 2/5/25 and discharged with these medications, she is not out and needs a refill     A-(assessment): patient had not been seen for hospital follow-up. Writer scheduled patient with first available appointment with PCP for follow up    Appointments in Next Year      Mar 27, 2025 10:00 AM  (Arrive by 9:45 AM)  ED/Hospital Follow Up with Alida Lamb MD  [facility] ([facility] [place] ) [phone]           Patient reports she has not seen cardiology as well.     R-(recommendations): Routing to PCP, please advise if medication can be re-prescribed.     Mariela RN 8:37 AM March 11, 2025   [facility]      
Alert and oriented to person, place and time